# Patient Record
Sex: MALE | Race: OTHER | HISPANIC OR LATINO | URBAN - METROPOLITAN AREA
[De-identification: names, ages, dates, MRNs, and addresses within clinical notes are randomized per-mention and may not be internally consistent; named-entity substitution may affect disease eponyms.]

---

## 2023-01-03 ENCOUNTER — HOSPITAL ENCOUNTER (EMERGENCY)
Facility: HOSPITAL | Age: 48
Discharge: HOME | End: 2023-01-03
Attending: EMERGENCY MEDICINE

## 2023-01-03 VITALS
RESPIRATION RATE: 21 BRPM | TEMPERATURE: 99 F | HEIGHT: 65 IN | HEART RATE: 83 BPM | SYSTOLIC BLOOD PRESSURE: 134 MMHG | OXYGEN SATURATION: 95 % | DIASTOLIC BLOOD PRESSURE: 75 MMHG | WEIGHT: 246 LBS | BODY MASS INDEX: 40.98 KG/M2

## 2023-01-03 DIAGNOSIS — S62.646A CLOSED NONDISPLACED FRACTURE OF PROXIMAL PHALANX OF RIGHT LITTLE FINGER, INITIAL ENCOUNTER: Primary | ICD-10-CM

## 2023-01-03 PROCEDURE — 25000003 PHARM REV CODE 250: Performed by: NURSE PRACTITIONER

## 2023-01-03 PROCEDURE — 99283 EMERGENCY DEPT VISIT LOW MDM: CPT | Mod: 25

## 2023-01-03 PROCEDURE — 29125 APPL SHORT ARM SPLINT STATIC: CPT | Mod: RT

## 2023-01-03 RX ORDER — IBUPROFEN 400 MG/1
800 TABLET ORAL
Status: COMPLETED | OUTPATIENT
Start: 2023-01-03 | End: 2023-01-03

## 2023-01-03 RX ADMIN — IBUPROFEN 800 MG: 400 TABLET, FILM COATED ORAL at 04:01

## 2023-01-03 NOTE — ED PROVIDER NOTES
Encounter Date: 1/3/2023    SCRIBE #1 NOTE: I, Carolyn Butler, am scribing for, and in the presence of,  Amarilis Brown NP. I have scribed the following portions of the note - Other sections scribed: HPI and ROS.       Chief Complaint   Patient presents with    Hand Pain     C/o right hand pain that started x 4 days ago after tripping and  falling at his apartment. Pt extended right hand to avoid falling on face. Pt radial pulses noted. Pt able to move fingers but complains of pain.      A 47-year-old male presents to the ED for right hand pain, onset on Saturday. At his apartment, he tripped and fell. He reports hitting his right hand on the ground after extending it to protect his face. He complains of right hand pain with associated swelling. He is able to move his fingers.    The history is provided by the patient. No  was used.   Review of patient's allergies indicates:  Not on File  No past medical history on file.  No past surgical history on file.  No family history on file.     Review of Systems   Constitutional:  Negative for chills and fever.   Respiratory:  Negative for cough, chest tightness and stridor.    Cardiovascular:  Negative for chest pain.   Gastrointestinal:  Negative for abdominal pain, nausea and vomiting.   Genitourinary:  Negative for genital sores.   Musculoskeletal:  Negative for myalgias.        Right hand pain   Skin:  Negative for rash.   All other systems reviewed and are negative.    Physical Exam     Initial Vitals [01/03/23 1541]   BP Pulse Resp Temp SpO2   134/75 83 (!) 21 98.9 °F (37.2 °C) 95 %      MAP       --         Physical Exam    Nursing note and vitals reviewed.  Constitutional: He appears well-developed and well-nourished.   HENT:   Head: Normocephalic and atraumatic.   Eyes: Conjunctivae and EOM are normal. Pupils are equal, round, and reactive to light.   Neck:   Normal range of motion.  Cardiovascular:  Normal rate, regular rhythm, normal heart  sounds and intact distal pulses.     Exam reveals no gallop and no friction rub.       No murmur heard.  Pulmonary/Chest: Breath sounds normal. No respiratory distress. He has no wheezes. He has no rhonchi. He has no rales. He exhibits no tenderness.   Musculoskeletal:         General: Tenderness and edema present.        Hands:       Cervical back: Normal range of motion.      Comments: Edema and ecchymosis noted to right hand at the base of the 5th digit     Neurological: He is alert and oriented to person, place, and time. He has normal strength. GCS score is 15. GCS eye subscore is 4. GCS verbal subscore is 5. GCS motor subscore is 6.   Skin: Skin is warm. Capillary refill takes less than 2 seconds. No erythema.       ED Course   Splint Application    Date/Time: 1/3/2023 6:04 PM  Performed by: Parviz Torre Jr., LPN  Authorized by: VALENCIA Ferraro   Consent Done: Not Needed  Location details: right hand  Splint type: ulnar gutter  Supplies used: elastic bandage, Ortho-Glass and cotton padding  Post-procedure: The splinted body part was neurovascularly unchanged following the procedure.  Patient tolerance: Patient tolerated the procedure well with no immediate complications      Labs Reviewed - No data to display       Imaging Results              X-Ray Hand 3 view Right (Final result)  Result time 01/03/23 17:31:42      Final result by Eduar Dos Santos MD (01/03/23 17:31:42)                   Impression:      1. Fifth digit fracture as above.      Electronically signed by: Eduar Dos Santos MD  Date:    01/03/2023  Time:    17:31               Narrative:    EXAMINATION:  XR HAND COMPLETE 3 VIEW RIGHT    CLINICAL HISTORY:  blunt trauma;    TECHNIQUE:  PA, lateral, and oblique views of the right hand were performed.    COMPARISON:  None    FINDINGS:  Three views right hand.    There is an impacted comminuted fracture involving the distal aspect of the proximal phalanx of the 5th digit, fracture planes  involve the PIP joint.  No dislocation.  There is edema about the fracture site.  There are degenerative changes of the hand.  No radiopaque foreign body.                                       Medications   ibuprofen tablet 800 mg (800 mg Oral Given 1/3/23 1632)     Medical Decision Making:   Initial Assessment:   48 y/o male which presents with swelling and pain to his right hand after falling on Saturday.   Differential Diagnosis:   Finger fracture, metacarpal fracture, contusion, sprain  Clinical Tests:   Radiological Study: Ordered and Reviewed  ED Management:  Pt examined and noted to have edema and ecchymosis to his right hand. Xray shows a 5th digit fracture between the MIP and PIP. An ulna gutter splint placed to stabilize the finger. Hand surgery referral placed. Patient given strict return precautions and voiced understanding of all discharge instructions. Pt was stable at discharge.               Scribe Attestation:   Scribe #1: I performed the above scribed service and the documentation accurately describes the services I performed. I attest to the accuracy of the note.    This document was produced by a scribe under my direction and in my presence. I agree with the content of the note and have made any necessary edits.     Amarilis Brown DNP, VALENCIA-BC    ED Course as of 01/03/23 2100   Tue Jan 03, 2023   1623 BP: 134/75 [AT]   1623 Temp: 98.9 °F (37.2 °C) [AT]   1623 Pulse: 83 [AT]   1623 Temp src: Oral [AT]   1623 Resp(!): 21 [AT]   1623 SpO2: 95 % [AT]      ED Course User Index  [AT] VALENCIA Ferraro                 Clinical Impression:   Final diagnoses:  [S62.646A] Closed nondisplaced fracture of proximal phalanx of right little finger, initial encounter (Primary)        ED Disposition Condition    Discharge Stable          ED Prescriptions    None       Follow-up Information       Follow up With Specialties Details Why Contact Info    Jim Whitt Jr., MD Hand Surgery, Orthopedic Surgery    [address]  [place] [address]  [phone]               Amarilis Brwon, [facility]  01/03/23 2100       Amarilis Brown, [facility]  01/03/23 2115

## 2023-01-03 NOTE — ED NOTES
Patient fell on right hand on Saturday and most pain is in the pinky. No discoloration noted to right hand, +radial pulse noted and +2 edema noted.     APPEARANCE: Alert, oriented and in no acute distress.  HEENT: Speaks without hoarseness.  CARDIAC: Normal rate and rhythm.    PERIPHERAL VASCULAR: peripheral pulses present. Normal cap refill. No edema. Warm to touch.    RESPIRATORY:Normal rate and effort. Respirations are equal and unlabored no obvious signs of distress.  GASTRO: soft, nondistended, nontender. Denies nausea, vomiting, or diarrhea.  : voids spontaneously and without difficulty.   MUSC: + right hand pain, swelling noted. +radial pulse to right arm. Full ROM. No obvious deformity. Ambulatory with a steady gait  SKIN: Skin is warm and dry, without discoloration. Mucous membranes moist.  NEURO: Pt is awake, alert, aware of environment. No neurologic deficits noted.